# Patient Record
Sex: FEMALE | Race: WHITE | NOT HISPANIC OR LATINO | Employment: FULL TIME | URBAN - METROPOLITAN AREA
[De-identification: names, ages, dates, MRNs, and addresses within clinical notes are randomized per-mention and may not be internally consistent; named-entity substitution may affect disease eponyms.]

---

## 2024-10-31 ENCOUNTER — APPOINTMENT (OUTPATIENT)
Dept: PRIMARY CARE | Facility: CLINIC | Age: 34
End: 2024-10-31
Payer: COMMERCIAL

## 2024-10-31 VITALS
BODY MASS INDEX: 26.67 KG/M2 | OXYGEN SATURATION: 98 % | HEIGHT: 68 IN | SYSTOLIC BLOOD PRESSURE: 110 MMHG | RESPIRATION RATE: 18 BRPM | TEMPERATURE: 97.8 F | WEIGHT: 176 LBS | DIASTOLIC BLOOD PRESSURE: 80 MMHG | HEART RATE: 71 BPM

## 2024-10-31 DIAGNOSIS — J45.20 MILD INTERMITTENT ASTHMA WITHOUT COMPLICATION (HHS-HCC): ICD-10-CM

## 2024-10-31 DIAGNOSIS — Z00.00 HEALTH CARE MAINTENANCE: ICD-10-CM

## 2024-10-31 DIAGNOSIS — Z30.432 ENCOUNTER FOR IUD REMOVAL: ICD-10-CM

## 2024-10-31 DIAGNOSIS — L73.9 FOLLICULITIS: ICD-10-CM

## 2024-10-31 DIAGNOSIS — Z97.5 IUD (INTRAUTERINE DEVICE) IN PLACE: ICD-10-CM

## 2024-10-31 DIAGNOSIS — J30.1 NON-SEASONAL ALLERGIC RHINITIS DUE TO POLLEN: ICD-10-CM

## 2024-10-31 DIAGNOSIS — Z00.00 ANNUAL PHYSICAL EXAM: Primary | ICD-10-CM

## 2024-10-31 PROBLEM — J45.909 ASTHMA: Status: ACTIVE | Noted: 2019-05-01

## 2024-10-31 PROCEDURE — 3008F BODY MASS INDEX DOCD: CPT

## 2024-10-31 PROCEDURE — 1036F TOBACCO NON-USER: CPT

## 2024-10-31 PROCEDURE — 99385 PREV VISIT NEW AGE 18-39: CPT

## 2024-10-31 RX ORDER — LORATADINE 10 MG/1
10 TABLET ORAL DAILY
Qty: 90 TABLET | Refills: 2 | Status: SHIPPED | OUTPATIENT
Start: 2024-10-31 | End: 2024-10-31

## 2024-10-31 RX ORDER — LORATADINE 10 MG/1
10 TABLET ORAL DAILY
Qty: 90 TABLET | Refills: 3 | Status: SHIPPED | OUTPATIENT
Start: 2024-10-31

## 2024-10-31 RX ORDER — LORATADINE 10 MG/1
10 TABLET ORAL DAILY
COMMUNITY
End: 2024-10-31 | Stop reason: SDUPTHER

## 2024-10-31 RX ORDER — MONTELUKAST SODIUM 10 MG/1
10 TABLET ORAL NIGHTLY
COMMUNITY
Start: 2024-09-23 | End: 2024-10-31 | Stop reason: SDUPTHER

## 2024-10-31 RX ORDER — CLINDAMYCIN AND BENZOYL PEROXIDE 10; 50 MG/G; MG/G
1 GEL TOPICAL 2 TIMES DAILY
COMMUNITY

## 2024-10-31 RX ORDER — MONTELUKAST SODIUM 10 MG/1
10 TABLET ORAL NIGHTLY
Qty: 90 TABLET | Refills: 3 | Status: SHIPPED | OUTPATIENT
Start: 2024-10-31

## 2024-10-31 RX ORDER — DOXYCYCLINE 100 MG/1
100 CAPSULE ORAL DAILY
COMMUNITY

## 2024-11-08 ENCOUNTER — LAB (OUTPATIENT)
Dept: LAB | Facility: LAB | Age: 34
End: 2024-11-08
Payer: COMMERCIAL

## 2024-11-08 DIAGNOSIS — Z00.00 HEALTH CARE MAINTENANCE: ICD-10-CM

## 2024-11-08 LAB
25(OH)D3 SERPL-MCNC: 37 NG/ML (ref 30–100)
ALBUMIN SERPL BCP-MCNC: 4.7 G/DL (ref 3.4–5)
ALP SERPL-CCNC: 60 U/L (ref 33–110)
ALT SERPL W P-5'-P-CCNC: 11 U/L (ref 7–45)
ANION GAP SERPL CALC-SCNC: 12 MMOL/L (ref 10–20)
AST SERPL W P-5'-P-CCNC: 14 U/L (ref 9–39)
BILIRUB SERPL-MCNC: 0.6 MG/DL (ref 0–1.2)
BUN SERPL-MCNC: 21 MG/DL (ref 6–23)
CALCIUM SERPL-MCNC: 9.4 MG/DL (ref 8.6–10.6)
CHLORIDE SERPL-SCNC: 104 MMOL/L (ref 98–107)
CHOLEST SERPL-MCNC: 185 MG/DL (ref 0–199)
CHOLESTEROL/HDL RATIO: 3.1
CO2 SERPL-SCNC: 27 MMOL/L (ref 21–32)
CREAT SERPL-MCNC: 0.82 MG/DL (ref 0.5–1.05)
EGFRCR SERPLBLD CKD-EPI 2021: >90 ML/MIN/1.73M*2
ERYTHROCYTE [DISTWIDTH] IN BLOOD BY AUTOMATED COUNT: 12.2 % (ref 11.5–14.5)
EST. AVERAGE GLUCOSE BLD GHB EST-MCNC: 111 MG/DL
GLUCOSE SERPL-MCNC: 98 MG/DL (ref 74–99)
HBA1C MFR BLD: 5.5 %
HCT VFR BLD AUTO: 39.2 % (ref 36–46)
HDLC SERPL-MCNC: 59.2 MG/DL
HGB BLD-MCNC: 12.6 G/DL (ref 12–16)
LDLC SERPL CALC-MCNC: 113 MG/DL
MCH RBC QN AUTO: 30.9 PG (ref 26–34)
MCHC RBC AUTO-ENTMCNC: 32.1 G/DL (ref 32–36)
MCV RBC AUTO: 96 FL (ref 80–100)
NON HDL CHOLESTEROL: 126 MG/DL (ref 0–149)
NRBC BLD-RTO: 0 /100 WBCS (ref 0–0)
PLATELET # BLD AUTO: 227 X10*3/UL (ref 150–450)
POTASSIUM SERPL-SCNC: 4.8 MMOL/L (ref 3.5–5.3)
PROT SERPL-MCNC: 6.8 G/DL (ref 6.4–8.2)
RBC # BLD AUTO: 4.08 X10*6/UL (ref 4–5.2)
SODIUM SERPL-SCNC: 138 MMOL/L (ref 136–145)
TRIGL SERPL-MCNC: 63 MG/DL (ref 0–149)
TSH SERPL-ACNC: 2.62 MIU/L (ref 0.44–3.98)
VIT B12 SERPL-MCNC: 524 PG/ML (ref 211–911)
VLDL: 13 MG/DL (ref 0–40)
WBC # BLD AUTO: 6.2 X10*3/UL (ref 4.4–11.3)

## 2024-11-08 PROCEDURE — 84443 ASSAY THYROID STIM HORMONE: CPT

## 2024-11-08 PROCEDURE — 80061 LIPID PANEL: CPT

## 2024-11-08 PROCEDURE — 83036 HEMOGLOBIN GLYCOSYLATED A1C: CPT

## 2024-11-08 PROCEDURE — 82607 VITAMIN B-12: CPT

## 2024-11-08 PROCEDURE — 80053 COMPREHEN METABOLIC PANEL: CPT

## 2024-11-08 PROCEDURE — 36415 COLL VENOUS BLD VENIPUNCTURE: CPT

## 2024-11-08 PROCEDURE — 82306 VITAMIN D 25 HYDROXY: CPT

## 2024-11-08 PROCEDURE — 85027 COMPLETE CBC AUTOMATED: CPT

## 2024-11-14 ENCOUNTER — TELEPHONE (OUTPATIENT)
Dept: PRIMARY CARE | Facility: CLINIC | Age: 34
End: 2024-11-14
Payer: COMMERCIAL

## 2024-11-14 NOTE — TELEPHONE ENCOUNTER
----- Message from Griselda Kyle sent at 11/14/2024 11:16 AM EST -----  Please call the patient with the following message, she has not yet viewed her result note, thank you    Rajendra Greco, your bad cholesterol (LDL) is just a little bit elevated.  However your total cholesterol and good cholesterol are normal.  You also have a desirable total to good cholesterol ratio which is great news. Please continue to work on healthy diet with plenty of fiber and regular exercise.  Fiber helps lower the cholesterol.  If you feel that you are not getting enough fiber in the diet, I personally like fiber Gummies or Benefiber powder as supplements.  Your A1c, which is a 3-month blood sugar average, is normal with no diabetes or prediabetes.  Your blood counts are normal with no sign of infection, inflammation, anemia, or blood clotting problems.  Your vitamin B12 is normal.  Your thyroid function is normal.  Your vitamin D level is normal.  Your fasting blood sugar, electrolytes, kidney function, and liver function are normal.  Please let me know if you have questions or concerns about any of this.  Thank you!

## 2024-12-06 ENCOUNTER — APPOINTMENT (OUTPATIENT)
Dept: OBSTETRICS AND GYNECOLOGY | Facility: CLINIC | Age: 34
End: 2024-12-06
Payer: COMMERCIAL

## 2025-02-03 DIAGNOSIS — J45.20 MILD INTERMITTENT ASTHMA WITHOUT COMPLICATION (HHS-HCC): ICD-10-CM

## 2025-02-03 RX ORDER — MONTELUKAST SODIUM 10 MG/1
10 TABLET ORAL NIGHTLY
Qty: 90 TABLET | Refills: 3 | Status: SHIPPED | OUTPATIENT
Start: 2025-02-03

## 2025-02-27 PROCEDURE — RXMED WILLOW AMBULATORY MEDICATION CHARGE

## 2025-03-05 ENCOUNTER — PHARMACY VISIT (OUTPATIENT)
Dept: PHARMACY | Facility: CLINIC | Age: 35
End: 2025-03-05
Payer: COMMERCIAL

## 2025-04-30 RX ORDER — ERGOCALCIFEROL 1.25 MG/1
CAPSULE ORAL
Qty: 12 CAPSULE | Refills: 0 | OUTPATIENT
Start: 2025-04-30

## 2025-05-02 PROCEDURE — RXMED WILLOW AMBULATORY MEDICATION CHARGE

## 2025-05-12 ENCOUNTER — PHARMACY VISIT (OUTPATIENT)
Dept: PHARMACY | Facility: CLINIC | Age: 35
End: 2025-05-12
Payer: COMMERCIAL

## 2025-05-31 PROCEDURE — RXMED WILLOW AMBULATORY MEDICATION CHARGE

## 2025-06-02 ENCOUNTER — PHARMACY VISIT (OUTPATIENT)
Dept: PHARMACY | Facility: CLINIC | Age: 35
End: 2025-06-02
Payer: COMMERCIAL

## 2025-06-24 ENCOUNTER — LAB (OUTPATIENT)
Dept: LAB | Facility: HOSPITAL | Age: 35
End: 2025-06-24
Payer: COMMERCIAL

## 2025-06-24 LAB — COTININE UR QL SCN: NEGATIVE

## 2025-06-26 ENCOUNTER — APPOINTMENT (OUTPATIENT)
Dept: PRIMARY CARE | Facility: CLINIC | Age: 35
End: 2025-06-26
Payer: COMMERCIAL

## 2025-08-14 PROCEDURE — RXMED WILLOW AMBULATORY MEDICATION CHARGE

## 2025-08-15 ENCOUNTER — PHARMACY VISIT (OUTPATIENT)
Dept: PHARMACY | Facility: CLINIC | Age: 35
End: 2025-08-15
Payer: COMMERCIAL

## 2025-11-07 ENCOUNTER — APPOINTMENT (OUTPATIENT)
Dept: PRIMARY CARE | Facility: CLINIC | Age: 35
End: 2025-11-07
Payer: COMMERCIAL